# Patient Record
(demographics unavailable — no encounter records)

---

## 2025-06-26 NOTE — HISTORY OF PRESENT ILLNESS
[FreeTextEntry1] : Patient has been wearing orthotics for many years.  She had desired a new pair of devices and presents with shoe gear today.

## 2025-06-26 NOTE — PHYSICAL EXAM
[General Appearance - Alert] : alert [General Appearance - Well Nourished] : well nourished [General Appearance - Well-Appearing] : healthy appearing [Ankle Swelling (On Exam)] : not present [Varicose Veins Of Lower Extremities] : not present [] : not present [Delayed in the Right Toes] : capillary refills normal in right toes [Delayed in the Left Toes] : capillary refills normal in the left toes [2+] : left foot dorsalis pedis 2+ [de-identified] : Neuroma left foot. [FreeTextEntry1] : Mycotic toenails 1 through 5 bilateral

## 2025-06-26 NOTE — PROCEDURE
[FreeTextEntry1] : I reviewed the condition, etiologies, options for care and treatment plan.  I again reviewed the abnormal biomechanics of the foot and the lower extremity.  We discussed the function of the orthotic device to treat the patient's condition and symptoms and to help place the foot in a more normal functioning position and gait. L 3000 dispensing foot inserts, removable, molded to the patient's model, UCB type, Mayes shell were dispensed for this ambulatory patient.  These devices required fabrication via digital scan of the feet using [facility] 3D scanner. These custom orthotics are medically necessary as prefabricated or custom fitted off-the-shelf foot orthoses will not suffice in this situation due to the orthopedic lower extremity pathology present. Due to the longstanding nature of the pathology present, this patient will require the use of these devices for a long duration which is more than 6 months.  Consistent use of these devices can reduce the potential for surgery, provide stabilization, and support. Proper use and care were reviewed as well as the proper break-in period. The orthotic device are comfortable and controlling well upon gait analysis.  He was able to ambulate without distress with the use of the custom-made orthotic devices with shoes.  Proper shoe gear was again discussed.  All questions were answered about the treatment plan and the use and function of the orthotic devices.  He was told to call the office with any questions or problems and schedule return appointment for a follow-up in 4 to 6 weeks.

## 2025-06-26 NOTE — ASSESSMENT
[FreeTextEntry1] : Device is fit well into all shoe gear patient presents with She is undergoing eye surgery next week and will begin wearing the orthotics in 2 weeks.